# Patient Record
Sex: FEMALE | Race: WHITE | NOT HISPANIC OR LATINO
[De-identification: names, ages, dates, MRNs, and addresses within clinical notes are randomized per-mention and may not be internally consistent; named-entity substitution may affect disease eponyms.]

---

## 2017-01-24 ENCOUNTER — RX RENEWAL (OUTPATIENT)
Age: 42
End: 2017-01-24

## 2017-04-06 ENCOUNTER — OTHER (OUTPATIENT)
Age: 42
End: 2017-04-06

## 2017-08-09 ENCOUNTER — APPOINTMENT (OUTPATIENT)
Dept: OBGYN | Facility: CLINIC | Age: 42
End: 2017-08-09
Payer: COMMERCIAL

## 2017-08-09 PROCEDURE — 99396 PREV VISIT EST AGE 40-64: CPT

## 2017-08-09 RX ORDER — NORGESTREL AND ETHINYL ESTRADIOL 0.5 MG-5
0.5-5 KIT ORAL
Qty: 90 | Refills: 0 | Status: COMPLETED | COMMUNITY
Start: 2017-04-06 | End: 2017-08-09

## 2017-08-11 LAB — HPV HIGH+LOW RISK DNA PNL CVX: NEGATIVE

## 2017-08-14 LAB — CYTOLOGY CVX/VAG DOC THIN PREP: NORMAL

## 2017-08-31 ENCOUNTER — APPOINTMENT (OUTPATIENT)
Dept: OBGYN | Facility: CLINIC | Age: 42
End: 2017-08-31
Payer: COMMERCIAL

## 2017-08-31 PROCEDURE — 58300 INSERT INTRAUTERINE DEVICE: CPT

## 2017-08-31 PROCEDURE — 81025 URINE PREGNANCY TEST: CPT

## 2017-09-18 ENCOUNTER — ASOB RESULT (OUTPATIENT)
Age: 42
End: 2017-09-18

## 2017-09-18 ENCOUNTER — APPOINTMENT (OUTPATIENT)
Dept: OBGYN | Facility: CLINIC | Age: 42
End: 2017-09-18
Payer: COMMERCIAL

## 2017-09-18 PROCEDURE — 76830 TRANSVAGINAL US NON-OB: CPT

## 2018-08-22 ENCOUNTER — APPOINTMENT (OUTPATIENT)
Dept: OBGYN | Facility: CLINIC | Age: 43
End: 2018-08-22
Payer: COMMERCIAL

## 2018-08-22 VITALS
HEART RATE: 87 BPM | WEIGHT: 128 LBS | BODY MASS INDEX: 21.33 KG/M2 | SYSTOLIC BLOOD PRESSURE: 123 MMHG | HEIGHT: 65 IN | DIASTOLIC BLOOD PRESSURE: 86 MMHG

## 2018-08-22 PROCEDURE — 99396 PREV VISIT EST AGE 40-64: CPT

## 2018-08-23 LAB — HPV HIGH+LOW RISK DNA PNL CVX: NOT DETECTED

## 2018-08-27 LAB — CYTOLOGY CVX/VAG DOC THIN PREP: NORMAL

## 2019-09-18 ENCOUNTER — APPOINTMENT (OUTPATIENT)
Dept: OBGYN | Facility: CLINIC | Age: 44
End: 2019-09-18
Payer: COMMERCIAL

## 2019-09-18 VITALS
SYSTOLIC BLOOD PRESSURE: 126 MMHG | WEIGHT: 152 LBS | DIASTOLIC BLOOD PRESSURE: 84 MMHG | BODY MASS INDEX: 25.33 KG/M2 | HEIGHT: 65 IN

## 2019-09-18 DIAGNOSIS — Z30.430 ENCOUNTER FOR INSERTION OF INTRAUTERINE CONTRACEPTIVE DEVICE: ICD-10-CM

## 2019-09-18 PROCEDURE — 99396 PREV VISIT EST AGE 40-64: CPT

## 2019-09-20 ENCOUNTER — TRANSCRIPTION ENCOUNTER (OUTPATIENT)
Age: 44
End: 2019-09-20

## 2019-09-20 LAB — HPV HIGH+LOW RISK DNA PNL CVX: NOT DETECTED

## 2019-09-25 LAB — CYTOLOGY CVX/VAG DOC THIN PREP: ABNORMAL

## 2020-12-02 ENCOUNTER — APPOINTMENT (OUTPATIENT)
Dept: OBGYN | Facility: CLINIC | Age: 45
End: 2020-12-02
Payer: COMMERCIAL

## 2020-12-02 VITALS
SYSTOLIC BLOOD PRESSURE: 122 MMHG | HEIGHT: 65 IN | DIASTOLIC BLOOD PRESSURE: 83 MMHG | BODY MASS INDEX: 25.99 KG/M2 | WEIGHT: 156 LBS

## 2020-12-02 DIAGNOSIS — Z01.419 ENCOUNTER FOR GYNECOLOGICAL EXAMINATION (GENERAL) (ROUTINE) W/OUT ABNORMAL FINDINGS: ICD-10-CM

## 2020-12-02 PROCEDURE — 99072 ADDL SUPL MATRL&STAF TM PHE: CPT

## 2020-12-02 PROCEDURE — 99396 PREV VISIT EST AGE 40-64: CPT

## 2020-12-02 RX ORDER — FLUOROMETHOLONE 1 MG/ML
0.1 SOLUTION/ DROPS OPHTHALMIC
Qty: 10 | Refills: 0 | Status: COMPLETED | COMMUNITY
Start: 2017-05-09 | End: 2020-12-02

## 2020-12-03 LAB — HPV HIGH+LOW RISK DNA PNL CVX: NOT DETECTED

## 2020-12-06 LAB — CYTOLOGY CVX/VAG DOC THIN PREP: NORMAL

## 2021-03-17 DIAGNOSIS — N89.8 OTHER SPECIFIED NONINFLAMMATORY DISORDERS OF VAGINA: ICD-10-CM

## 2021-06-04 ENCOUNTER — APPOINTMENT (OUTPATIENT)
Age: 46
End: 2021-06-04
Payer: COMMERCIAL

## 2021-06-04 PROCEDURE — 0012A: CPT

## 2021-11-03 ENCOUNTER — NON-APPOINTMENT (OUTPATIENT)
Age: 46
End: 2021-11-03

## 2021-12-08 ENCOUNTER — APPOINTMENT (OUTPATIENT)
Dept: OBGYN | Facility: CLINIC | Age: 46
End: 2021-12-08
Payer: COMMERCIAL

## 2021-12-08 VITALS
BODY MASS INDEX: 25.66 KG/M2 | HEIGHT: 65 IN | DIASTOLIC BLOOD PRESSURE: 89 MMHG | WEIGHT: 154 LBS | SYSTOLIC BLOOD PRESSURE: 137 MMHG

## 2021-12-08 DIAGNOSIS — Z01.419 ENCOUNTER FOR GYNECOLOGICAL EXAMINATION (GENERAL) (ROUTINE) W/OUT ABNORMAL FINDINGS: ICD-10-CM

## 2021-12-08 PROCEDURE — 99396 PREV VISIT EST AGE 40-64: CPT

## 2021-12-08 RX ORDER — FLUCONAZOLE 150 MG/1
150 TABLET ORAL
Qty: 1 | Refills: 0 | Status: COMPLETED | COMMUNITY
Start: 2021-03-17 | End: 2021-12-08

## 2021-12-09 LAB — HPV HIGH+LOW RISK DNA PNL CVX: NOT DETECTED

## 2021-12-14 LAB — CYTOLOGY CVX/VAG DOC THIN PREP: NORMAL

## 2023-03-15 ENCOUNTER — APPOINTMENT (OUTPATIENT)
Dept: OBGYN | Facility: CLINIC | Age: 48
End: 2023-03-15
Payer: COMMERCIAL

## 2023-03-15 VITALS
BODY MASS INDEX: 26.98 KG/M2 | HEIGHT: 64 IN | DIASTOLIC BLOOD PRESSURE: 88 MMHG | SYSTOLIC BLOOD PRESSURE: 146 MMHG | WEIGHT: 158 LBS

## 2023-03-15 DIAGNOSIS — Z01.419 ENCOUNTER FOR GYNECOLOGICAL EXAMINATION (GENERAL) (ROUTINE) W/OUT ABNORMAL FINDINGS: ICD-10-CM

## 2023-03-15 PROCEDURE — 99396 PREV VISIT EST AGE 40-64: CPT

## 2023-03-15 RX ORDER — LEVOTHYROXINE SODIUM 25 UG/1
25 CAPSULE ORAL
Refills: 0 | Status: ACTIVE | COMMUNITY
Start: 2023-03-15

## 2023-03-16 LAB — HPV HIGH+LOW RISK DNA PNL CVX: NOT DETECTED

## 2023-03-19 LAB — CYTOLOGY CVX/VAG DOC THIN PREP: NORMAL

## 2023-03-31 ENCOUNTER — NON-APPOINTMENT (OUTPATIENT)
Age: 48
End: 2023-03-31

## 2023-03-31 RX ORDER — METRONIDAZOLE 7.5 MG/G
0.75 GEL VAGINAL
Qty: 1 | Refills: 0 | Status: ACTIVE | COMMUNITY
Start: 2023-03-31

## 2024-03-13 ENCOUNTER — APPOINTMENT (OUTPATIENT)
Dept: OBGYN | Facility: CLINIC | Age: 49
End: 2024-03-13
Payer: COMMERCIAL

## 2024-03-13 DIAGNOSIS — Z30.430 ENCOUNTER FOR INSERTION OF INTRAUTERINE CONTRACEPTIVE DEVICE: ICD-10-CM

## 2024-03-13 DIAGNOSIS — Z30.432 ENCOUNTER FOR REMOVAL OF INTRAUTERINE CONTRACEPTIVE DEVICE: ICD-10-CM

## 2024-03-13 PROCEDURE — 58300 INSERT INTRAUTERINE DEVICE: CPT

## 2024-03-13 PROCEDURE — 58301 REMOVE INTRAUTERINE DEVICE: CPT

## 2024-03-13 PROCEDURE — A4550 SURGICAL TRAYS: CPT

## 2024-03-13 RX ADMIN — LEVONORGESTREL MCG/DAY: 52 INTRAUTERINE DEVICE INTRAUTERINE at 00:00

## 2024-03-13 NOTE — HISTORY OF PRESENT ILLNESS
[FreeTextEntry1] : exchange of IUD   Patient has a heavy and crampy menstrual period 4 weeks ago

## 2024-03-13 NOTE — PHYSICAL EXAM
[Labia Minora] : normal [Labia Majora] : normal [IUD String] : an IUD string was noted [Normal] : normal [Uterine Adnexae] : normal

## 2024-03-13 NOTE — PROCEDURE
[IUD Placement] : intrauterine device (IUD) placement [Abnormal Uterine Bleeding] : abnormal uterine bleeding [Bleeding] : bleeding [Expulsion] : expulsion [Pain] : pain [Uterine Perforation] : uterine perforation [No Premedication] : No premedication [Neg Pregnancy Test] : negative pregnancy test [Easy Passage] : Easy passage [Sounded to ___ cm] : sounded to [unfilled] ~Ucm [Tenaculum] : Tenaculum [Mirena IUD] : Mirena IUD [Motrin/Ibuprofen] : Motrin/Ibuprofen [Consent Obtained] : Consent obtained [IUD Removal] : intrauterine device (IUD) removal [ IUD] :  IUD [Benefits] : benefits [Risks] : risks [Patient] : patient [Speculum Placed] : speculum placed [IUD Removed - Forceps] : IUD removed - forceps [Tolerated Well] : Patient tolerated the procedure well [No Complications] : no complications [IUD Discarded] : IUD discarded [Heavy Vaginal Bleeding] : for heavy vaginal bleeding [de-identified] : menorrhagia [LMPDate] : 4 weeks  [de-identified] : PCB with 1% xylocaine   5cc/ side

## 2024-05-03 ENCOUNTER — APPOINTMENT (OUTPATIENT)
Dept: OBGYN | Facility: CLINIC | Age: 49
End: 2024-05-03

## 2024-05-03 ENCOUNTER — ASOB RESULT (OUTPATIENT)
Age: 49
End: 2024-05-03

## 2024-05-03 PROCEDURE — 76830 TRANSVAGINAL US NON-OB: CPT

## 2024-06-05 ENCOUNTER — NON-APPOINTMENT (OUTPATIENT)
Age: 49
End: 2024-06-05

## 2025-05-13 ENCOUNTER — NON-APPOINTMENT (OUTPATIENT)
Age: 50
End: 2025-05-13

## 2025-05-14 ENCOUNTER — APPOINTMENT (OUTPATIENT)
Dept: OBGYN | Facility: CLINIC | Age: 50
End: 2025-05-14
Payer: COMMERCIAL

## 2025-05-14 VITALS
WEIGHT: 136 LBS | HEIGHT: 64 IN | SYSTOLIC BLOOD PRESSURE: 138 MMHG | DIASTOLIC BLOOD PRESSURE: 82 MMHG | BODY MASS INDEX: 23.22 KG/M2

## 2025-05-14 DIAGNOSIS — Z30.432 ENCOUNTER FOR REMOVAL OF INTRAUTERINE CONTRACEPTIVE DEVICE: ICD-10-CM

## 2025-05-14 DIAGNOSIS — Z30.430 ENCOUNTER FOR INSERTION OF INTRAUTERINE CONTRACEPTIVE DEVICE: ICD-10-CM

## 2025-05-14 DIAGNOSIS — Z87.42 PERSONAL HISTORY OF OTHER DISEASES OF THE FEMALE GENITAL TRACT: ICD-10-CM

## 2025-05-14 DIAGNOSIS — Z01.419 ENCOUNTER FOR GYNECOLOGICAL EXAMINATION (GENERAL) (ROUTINE) W/OUT ABNORMAL FINDINGS: ICD-10-CM

## 2025-05-14 PROCEDURE — 99459 PELVIC EXAMINATION: CPT

## 2025-05-14 PROCEDURE — 99396 PREV VISIT EST AGE 40-64: CPT

## 2025-05-15 LAB — HPV HIGH+LOW RISK DNA PNL CVX: NOT DETECTED

## 2025-05-20 LAB — CYTOLOGY CVX/VAG DOC THIN PREP: NORMAL
